# Patient Record
Sex: FEMALE | Race: WHITE | NOT HISPANIC OR LATINO
[De-identification: names, ages, dates, MRNs, and addresses within clinical notes are randomized per-mention and may not be internally consistent; named-entity substitution may affect disease eponyms.]

---

## 2017-10-24 ENCOUNTER — CHARTING TRANS (OUTPATIENT)
Dept: OTHER | Age: 37
End: 2017-10-24

## 2017-10-24 ENCOUNTER — LAB SERVICES (OUTPATIENT)
Dept: OTHER | Age: 37
End: 2017-10-24

## 2017-10-24 LAB
APPEARANCE: CLEAR
BILIRUBIN: NEGATIVE
COLOR: YELLOW
GLUCOSE U: NEGATIVE
KETONES: 40
LEUKOCYTE ESTERASE: NORMAL
NITRITE: NEGATIVE
OCCULT BLOOD: NORMAL
PH: 6.5
PROTEIN: NEGATIVE
URINE SPEC GRAVITY: 1.02
UROBILINOGEN: 0.2

## 2017-10-24 ASSESSMENT — PAIN SCALES - GENERAL: PAINLEVEL_OUTOF10: 5

## 2017-10-27 LAB — BACTERIA UR CULT: NORMAL

## 2018-11-02 VITALS
TEMPERATURE: 98 F | HEART RATE: 84 BPM | WEIGHT: 143.3 LBS | SYSTOLIC BLOOD PRESSURE: 129 MMHG | DIASTOLIC BLOOD PRESSURE: 94 MMHG | OXYGEN SATURATION: 100 % | RESPIRATION RATE: 16 BRPM

## 2023-03-22 RX ORDER — ESOMEPRAZOLE MAGNESIUM 40 MG/1
CAPSULE, DELAYED RELEASE ORAL
Qty: 1 CAPSULE | Refills: 0 | OUTPATIENT
Start: 2023-03-22 | End: 2023-03-23

## 2023-03-22 RX ORDER — MONTELUKAST SODIUM 10 MG/1
TABLET ORAL
Qty: 1 TABLET | Refills: 0 | OUTPATIENT
Start: 2023-03-22 | End: 2023-03-23

## 2023-06-19 ENCOUNTER — APPOINTMENT (OUTPATIENT)
Dept: LAB | Facility: LAB | Age: 43
End: 2023-06-19
Payer: COMMERCIAL

## 2023-06-19 LAB
ANTI-DNA (DS): <1 IU/ML
ANTI-SSA: <0.2 AI
COMPLEMENT C3 (MG/DL) IN SER/PLAS: 135 MG/DL (ref 87–200)
COMPLEMENT C4 (MG/DL) IN SER/PLAS: 41 MG/DL (ref 10–50)

## 2023-06-21 LAB
COLLECTION DURATION OF URINE: 24 HR
CREATININE (MG/24HR) IN 24 HOUR URINE: 1.37 G/24H (ref 0.67–1.59)
CREATININE (MG/DL) IN URINE: 65.3 MG/DL (ref 20–320)
PROTEIN (MG/24HR) IN 24 HOUR URINE: 105 MG/24H (ref 0–149)
PROTEIN URINE: 5 MG/DL
VOLUME OF URINE: 2101 ML

## 2023-07-19 LAB
CALCIDIOL (25 OH VITAMIN D3) (NG/ML) IN SER/PLAS: 75 NG/ML
COBALAMIN (VITAMIN B12) (PG/ML) IN SER/PLAS: 621 PG/ML (ref 211–911)

## 2023-07-20 LAB
DEHYDROEPIANDROSTERONE SULFATE (DHEA-S) (UG/DL) IN SER/: 139 UG/DL (ref 12–379)
PROGESTERONE (NG/ML) IN SER/PLAS: 0.4 NG/ML

## 2023-07-23 LAB — ESTROGEN,TOTAL: 112 PG/ML

## 2023-07-24 LAB
VITAMIN B1, WHOLE BLOOD: 170 NMOL/L (ref 70–180)
VITAMIN B6: 66.9 NMOL/L (ref 20–125)

## 2023-07-25 LAB
TESTOSTERONE FREE (CHAN): 1.1 PG/ML (ref 0.1–6.4)
TESTOSTERONE,TOTAL,LC-MS/MS: 27 NG/DL (ref 2–45)
VITAMIN B2, PLASMA: 35 NMOL/L (ref 5–50)

## 2023-09-27 PROBLEM — E06.3 HASHIMOTO'S THYROIDITIS: Status: ACTIVE | Noted: 2017-02-14

## 2023-09-27 PROBLEM — M21.271: Status: ACTIVE | Noted: 2022-12-13

## 2023-09-27 PROBLEM — Z15.09 BRCA1-ASSOCIATED PROTEIN-1 TUMOR PREDISPOSITION SYNDROME: Status: ACTIVE | Noted: 2023-09-27

## 2023-09-27 PROBLEM — G57.62 MORTON'S NEUROMA OF LEFT FOOT: Status: ACTIVE | Noted: 2023-09-27

## 2023-09-27 PROBLEM — M25.50 ARTHRALGIA: Status: ACTIVE | Noted: 2023-06-10

## 2023-09-27 PROBLEM — E34.9 HORMONAL DISORDER: Status: ACTIVE | Noted: 2023-09-27

## 2023-09-27 PROBLEM — H60.60 CHRONIC OTITIS EXTERNA: Status: ACTIVE | Noted: 2023-06-10

## 2023-09-27 PROBLEM — R01.1 CARDIAC MURMUR: Status: ACTIVE | Noted: 2018-05-11

## 2023-09-27 PROBLEM — T75.3XXA MOTION SICKNESS: Status: ACTIVE | Noted: 2018-09-21

## 2023-09-27 PROBLEM — Z15.01 BRCA1-ASSOCIATED PROTEIN-1 TUMOR PREDISPOSITION SYNDROME: Status: ACTIVE | Noted: 2023-09-27

## 2023-09-27 PROBLEM — J32.9 CHRONIC SINUSITIS: Status: ACTIVE | Noted: 2017-06-30

## 2023-09-27 PROBLEM — K22.2 ESOPHAGEAL STENOSIS: Status: ACTIVE | Noted: 2021-05-05

## 2023-09-27 PROBLEM — J30.9 ALLERGIC RHINITIS: Status: ACTIVE | Noted: 2023-06-10

## 2023-09-27 PROBLEM — M79.7 FIBROMYALGIA: Status: ACTIVE | Noted: 2023-09-27

## 2023-09-27 PROBLEM — K58.9 IRRITABLE BOWEL SYNDROME: Status: ACTIVE | Noted: 2023-09-27

## 2023-09-27 PROBLEM — K22.2 SCHATZKI'S RING: Status: ACTIVE | Noted: 2017-06-30

## 2023-09-27 PROBLEM — M79.10 MYALGIA: Status: ACTIVE | Noted: 2023-09-27

## 2023-09-27 PROBLEM — F32.A ANXIETY AND DEPRESSION: Status: ACTIVE | Noted: 2023-09-27

## 2023-09-27 PROBLEM — M54.2 CERVICALGIA: Status: ACTIVE | Noted: 2023-09-27

## 2023-09-27 PROBLEM — R76.8 POSITIVE ANA (ANTINUCLEAR ANTIBODY): Status: ACTIVE | Noted: 2023-09-27

## 2023-09-27 PROBLEM — E03.9 ACQUIRED HYPOTHYROIDISM: Status: ACTIVE | Noted: 2023-06-10

## 2023-09-27 PROBLEM — F41.9 ANXIETY AND DEPRESSION: Status: ACTIVE | Noted: 2023-09-27

## 2023-09-27 PROBLEM — M26.629 TMJ PAIN DYSFUNCTION SYNDROME: Status: ACTIVE | Noted: 2023-09-27

## 2023-09-27 PROBLEM — Z15.02 BRCA1-ASSOCIATED PROTEIN-1 TUMOR PREDISPOSITION SYNDROME: Status: ACTIVE | Noted: 2023-09-27

## 2023-09-27 PROBLEM — F51.01 PRIMARY INSOMNIA: Status: ACTIVE | Noted: 2023-06-10

## 2023-09-27 PROBLEM — E78.2 MIXED HYPERLIPIDEMIA: Status: ACTIVE | Noted: 2023-06-10

## 2023-09-27 PROBLEM — M35.7 FOOT JOINT HYPERMOBILITY: Status: ACTIVE | Noted: 2022-12-13

## 2023-09-27 PROBLEM — R01.1 SYSTOLIC MURMUR: Status: ACTIVE | Noted: 2017-06-30

## 2023-09-27 RX ORDER — FLUOCINOLONE ACETONIDE 0.1 MG/ML
SOLUTION TOPICAL
COMMUNITY
Start: 2022-04-05

## 2023-09-27 RX ORDER — SUMATRIPTAN SUCCINATE 100 MG/1
100 TABLET ORAL 2 TIMES DAILY
COMMUNITY
Start: 2022-09-21

## 2023-09-27 RX ORDER — LIOTHYRONINE SODIUM 5 UG/1
1 TABLET ORAL DAILY
COMMUNITY
Start: 2019-07-23

## 2023-09-27 RX ORDER — LORATADINE 10 MG/1
10 TABLET ORAL
COMMUNITY

## 2023-09-27 RX ORDER — VITAMIN E (DL,TOCOPHERYL ACET) 180 MG
2 CAPSULE ORAL DAILY
COMMUNITY

## 2023-09-27 RX ORDER — LEVOTHYROXINE SODIUM 112 UG/1
112 TABLET ORAL DAILY
COMMUNITY
Start: 2017-06-25

## 2023-09-27 RX ORDER — MOMETASONE FUROATE 50 UG/1
SPRAY, METERED NASAL
COMMUNITY
Start: 2013-04-28

## 2023-09-27 RX ORDER — AZELASTINE HYDROCHLORIDE 0.5 MG/ML
SOLUTION/ DROPS OPHTHALMIC
COMMUNITY
Start: 2022-12-06

## 2023-09-27 RX ORDER — ONDANSETRON 4 MG/1
4 TABLET, ORALLY DISINTEGRATING ORAL EVERY 8 HOURS PRN
COMMUNITY
Start: 2022-05-24

## 2023-09-27 RX ORDER — LOSARTAN POTASSIUM AND HYDROCHLOROTHIAZIDE 25; 100 MG/1; MG/1
1 TABLET ORAL DAILY
COMMUNITY
Start: 2019-02-27

## 2023-09-27 RX ORDER — HYDROCHLOROTHIAZIDE 25 MG/1
1 TABLET ORAL DAILY
COMMUNITY
Start: 2017-12-28

## 2023-09-27 RX ORDER — ALPRAZOLAM 0.25 MG/1
0.25 TABLET ORAL AS NEEDED
COMMUNITY
Start: 2021-01-30

## 2023-09-27 RX ORDER — FLUTICASONE PROPIONATE 50 MCG
1 SPRAY, SUSPENSION (ML) NASAL
COMMUNITY

## 2023-09-27 RX ORDER — LOSARTAN POTASSIUM AND HYDROCHLOROTHIAZIDE 12.5; 5 MG/1; MG/1
1 TABLET ORAL DAILY
COMMUNITY

## 2023-09-27 RX ORDER — LIOTHYRONINE SODIUM 10 UG/ML
10 INJECTION, SOLUTION INTRAVENOUS
COMMUNITY

## 2023-09-27 RX ORDER — MUPIROCIN 20 MG/G
OINTMENT TOPICAL
COMMUNITY
Start: 2023-04-06

## 2023-09-27 RX ORDER — DOCUSATE SODIUM 100 MG/1
1 CAPSULE, LIQUID FILLED ORAL 2 TIMES DAILY
COMMUNITY
Start: 2021-05-12

## 2023-09-27 RX ORDER — GABAPENTIN 300 MG/1
300 CAPSULE ORAL 3 TIMES DAILY
COMMUNITY
Start: 2023-06-20 | End: 2024-06-19

## 2023-09-27 RX ORDER — CONJUGATED ESTROGENS AND MEDROXYPROGESTERONE ACETATE .625; 2.5 MG/1; MG/1
1 TABLET, SUGAR COATED ORAL
COMMUNITY
Start: 2023-06-16

## 2023-09-27 RX ORDER — ACETAMINOPHEN 325 MG/1
2 TABLET ORAL EVERY 4 HOURS PRN
COMMUNITY
Start: 2021-05-12

## 2023-09-27 RX ORDER — ESOMEPRAZOLE MAGNESIUM 40 MG/1
40 CAPSULE, DELAYED RELEASE ORAL
COMMUNITY
Start: 2022-04-05

## 2023-09-27 RX ORDER — POTASSIUM CHLORIDE 750 MG/1
1 TABLET, FILM COATED, EXTENDED RELEASE ORAL DAILY
COMMUNITY
Start: 2017-06-30

## 2023-09-27 RX ORDER — VENLAFAXINE HYDROCHLORIDE 37.5 MG/1
37.5 CAPSULE, EXTENDED RELEASE ORAL DAILY
COMMUNITY
Start: 2022-04-05

## 2023-09-27 RX ORDER — BUDESONIDE 0.5 MG/2ML
INHALANT ORAL
COMMUNITY
Start: 2017-07-18

## 2023-09-27 RX ORDER — TRAZODONE HYDROCHLORIDE 50 MG/1
50 TABLET ORAL
COMMUNITY
Start: 2022-12-19

## 2023-09-27 RX ORDER — MONTELUKAST SODIUM 10 MG/1
1 TABLET ORAL DAILY
COMMUNITY
Start: 2022-04-05

## 2023-09-27 RX ORDER — KETOCONAZOLE 20 MG/G
CREAM TOPICAL
COMMUNITY
Start: 2023-04-06

## 2023-09-27 RX ORDER — AZELASTINE 1 MG/ML
2 SPRAY, METERED NASAL NIGHTLY
COMMUNITY
Start: 2017-08-15

## 2023-09-27 RX ORDER — PANTOPRAZOLE SODIUM 40 MG/1
40 TABLET, DELAYED RELEASE ORAL
COMMUNITY
Start: 2021-01-20

## 2023-09-27 RX ORDER — SUCRALFATE 1 G/1
1 TABLET ORAL EVERY 12 HOURS
COMMUNITY
Start: 2022-12-19

## 2023-09-27 RX ORDER — VENLAFAXINE HYDROCHLORIDE 150 MG/1
150 CAPSULE, EXTENDED RELEASE ORAL
COMMUNITY
Start: 2021-02-25

## 2023-09-27 RX ORDER — ESTRADIOL AND NORETHINDRONE ACETATE 1; .5 MG/1; MG/1
1 TABLET ORAL
COMMUNITY
Start: 2022-11-26

## 2023-09-27 RX ORDER — CICLOPIROX 7.7 MG/G
GEL TOPICAL 2 TIMES DAILY
COMMUNITY
Start: 2023-04-07

## 2023-09-27 RX ORDER — ACETAMINOPHEN 500 MG
50 TABLET ORAL
COMMUNITY

## 2023-09-27 RX ORDER — OXYCODONE HYDROCHLORIDE 5 MG/1
1 TABLET ORAL EVERY 8 HOURS PRN
COMMUNITY
Start: 2021-05-12

## 2023-09-27 RX ORDER — AMLODIPINE BESYLATE 5 MG/1
5 TABLET ORAL
COMMUNITY
Start: 2021-03-24

## 2023-10-02 ENCOUNTER — APPOINTMENT (OUTPATIENT)
Dept: INTEGRATIVE MEDICINE | Facility: CLINIC | Age: 43
End: 2023-10-02
Payer: COMMERCIAL

## 2023-10-09 ENCOUNTER — ALLIED HEALTH (OUTPATIENT)
Dept: INTEGRATIVE MEDICINE | Facility: CLINIC | Age: 43
End: 2023-10-09
Payer: COMMERCIAL

## 2023-10-09 DIAGNOSIS — M99.01 SOMATIC DYSFUNCTION OF CERVICAL REGION: Primary | ICD-10-CM

## 2023-10-09 DIAGNOSIS — M99.03 SOMATIC DYSFUNCTION OF LUMBAR REGION: ICD-10-CM

## 2023-10-09 DIAGNOSIS — M54.50 CHRONIC LOW BACK PAIN WITHOUT SCIATICA, UNSPECIFIED BACK PAIN LATERALITY: ICD-10-CM

## 2023-10-09 DIAGNOSIS — M99.02 SOMATIC DYSFUNCTION OF THORACIC REGION: ICD-10-CM

## 2023-10-09 DIAGNOSIS — M99.04 SACROILIAC JOINT SOMATIC DYSFUNCTION: ICD-10-CM

## 2023-10-09 DIAGNOSIS — M79.18 MYALGIA, OTHER SITE: ICD-10-CM

## 2023-10-09 DIAGNOSIS — G89.29 CHRONIC LOW BACK PAIN WITHOUT SCIATICA, UNSPECIFIED BACK PAIN LATERALITY: ICD-10-CM

## 2023-10-09 DIAGNOSIS — M26.629 TMJ PAIN DYSFUNCTION SYNDROME: ICD-10-CM

## 2023-10-09 DIAGNOSIS — M54.2 CERVICALGIA: ICD-10-CM

## 2023-10-09 PROCEDURE — 98941 CHIROPRACT MANJ 3-4 REGIONS: CPT | Performed by: CHIROPRACTOR

## 2023-10-09 PROCEDURE — 97112 NEUROMUSCULAR REEDUCATION: CPT | Performed by: CHIROPRACTOR

## 2023-10-09 ASSESSMENT — ENCOUNTER SYMPTOMS
CONSTIPATION: 0
FATIGUE: 0
CHEST TIGHTNESS: 0
ABDOMINAL PAIN: 0
FEVER: 0
DIARRHEA: 0
JOINT SWELLING: 0
CONFUSION: 0
TROUBLE SWALLOWING: 0
FREQUENCY: 0

## 2023-10-09 NOTE — PROGRESS NOTES
Subjective   Patient ID: Terra Cross is a 43 y.o. female who presents today for full spine complaints, widespread body aches, B hand and feet tingling.     10/26 [facility]    HPI - The patient reports that she had recently returned from a trip to [place]. She reports that she continues to experience left foot pain but it has been less severe lately. She notes continued jaw tightness as well as neck and upper back discomfort.  Neuropathic symptoms also remain present. (She believes her foot pain is due to over supination when she is walking. She points to the lateral, somewhat distal aspect of the foot (L>R) as the source of the pain which is very tender to palpation. )    (07/19/2023: The patient is presenting for initial evaluation and management of chronic complaints of widespread body as well as neuropathic symptoms. She also reports a history of TMJ concerns as well as a previous left ulnar release procedure at the cubital tunnel. All of which led her to receive chiropractic care in the past. She further reports that in 2020 she sustained a C. Diff. infection and she then noticed a spike in her pain syndrome as well as neuropathic symptoms. She mostly describes her spinal complaints as achy and stiff and describes her feet and hand symptoms as tingling and pulsing. This is constant. She was treated in the past for a Barlow's Neuroma on the left but that diagnosis was inconclusive. The left foot sensations and pain have been severe enough to cause falls and altered gait in the past. Provocative factors do include standing and walking. She reports that her travel and social life has also been negatively affected due to her pain syndrome.)     Review of Systems   Constitutional:  Negative for fatigue and fever.   HENT:  Negative for trouble swallowing.    Eyes:  Negative for visual disturbance.   Respiratory:  Negative for chest tightness.    Cardiovascular:  Negative for chest pain and leg swelling.    Gastrointestinal:  Negative for abdominal pain, constipation and diarrhea.   Genitourinary:  Negative for frequency.   Musculoskeletal:  Negative for joint swelling.   Neurological:  Negative for syncope.   Psychiatric/Behavioral:  Negative for confusion.    All other systems reviewed and are negative.      Objective     The patient is alert and oriented x 3. FHC.  Cranial nerves II-XII are grossly intact. No difficulty with transitional movements is observed.  Rounded shoulders.  Elevated right iliac crest.  Leg length is symmetric.  No deficits observed when analyzing gait.  No scarring is present.    TMJ deviation to left with opening and closing.     Mild to moderate hypertonicity and tenderness is present in the right, masseter, suboccipitals, cervical paraspinals, SCM, upper trapezius, levator scapulae, rhomboids, thoracic paraspinals, lumbar paraspinals, left upper gluteals.  Some improvement noted as a relates to severity.    Segmental joint dysfunction was assessed with motion palpation and is identified in the following areas:  Cervical: C6/7  Thoracic: T3/4, T4/5, T/6  Lumbopelvic: L4/5, L5/S1, Left SI    Assessment/Plan   While symptoms do persist, there is reduction in objective findings.    (07/19/2023: Initial presentation is consistent with peripheral neuropathy and potentially small fiber disease with an underlying autoimmune component. I have placed a referral for neurological consultation based on these findings. In addition, the presentation is consistent with somatic dysfunction and myofascial pain. I do not identify any contraindications to a trial o care. Treatment will include various manual therapy techniques such as ART or IASTM. She receives acupuncture at an outside clinic. Knowing that we will not perform dry needling.)       Today's treatment:  Drop-table manipulation applied to the left sacroiliac joint.   Diversified manipulation to the involved cervical, thoracic and lumbar  segments, and sacroiliac joints.    Neuromuscular re-education in the form of ART applied to B proximal  levator scapulae, B SCM. Contract relax and ART applied to TMJ.  Start time for NMR was 9:45 am and ending time was 10:00 am.     Treatment Plan:   The patient tolerated today's treatment with little or no additional discomfort and was instructed to contact the office for questions or concerns.   Return in 2-3 weeks.  Procedure for breast implant removal is 11/10/2023.    Please note: Voice to text software was used when completing this note. While the note was proofread, portions may include grammatical errors. Please contact me with any questions/concerns as it relates to these types of errors.

## 2023-10-18 ENCOUNTER — ALLIED HEALTH (OUTPATIENT)
Dept: INTEGRATIVE MEDICINE | Facility: CLINIC | Age: 43
End: 2023-10-18
Payer: COMMERCIAL

## 2023-10-18 DIAGNOSIS — G89.29 CHRONIC LOW BACK PAIN WITHOUT SCIATICA, UNSPECIFIED BACK PAIN LATERALITY: ICD-10-CM

## 2023-10-18 DIAGNOSIS — M99.03 SOMATIC DYSFUNCTION OF LUMBAR REGION: ICD-10-CM

## 2023-10-18 DIAGNOSIS — M99.01 SOMATIC DYSFUNCTION OF CERVICAL REGION: Primary | ICD-10-CM

## 2023-10-18 DIAGNOSIS — M99.04 SACROILIAC JOINT SOMATIC DYSFUNCTION: ICD-10-CM

## 2023-10-18 DIAGNOSIS — M54.50 CHRONIC LOW BACK PAIN WITHOUT SCIATICA, UNSPECIFIED BACK PAIN LATERALITY: ICD-10-CM

## 2023-10-18 DIAGNOSIS — M54.2 CERVICALGIA: ICD-10-CM

## 2023-10-18 DIAGNOSIS — M79.18 MYALGIA, OTHER SITE: ICD-10-CM

## 2023-10-18 DIAGNOSIS — M26.629 TMJ PAIN DYSFUNCTION SYNDROME: ICD-10-CM

## 2023-10-18 DIAGNOSIS — M99.02 SOMATIC DYSFUNCTION OF THORACIC REGION: ICD-10-CM

## 2023-10-18 PROCEDURE — 98941 CHIROPRACT MANJ 3-4 REGIONS: CPT | Performed by: CHIROPRACTOR

## 2023-10-18 PROCEDURE — 97112 NEUROMUSCULAR REEDUCATION: CPT | Performed by: CHIROPRACTOR

## 2023-10-18 ASSESSMENT — ENCOUNTER SYMPTOMS
CONFUSION: 0
FEVER: 0
FREQUENCY: 0
TROUBLE SWALLOWING: 0
FATIGUE: 0
DIARRHEA: 0
ABDOMINAL PAIN: 0
JOINT SWELLING: 0
CHEST TIGHTNESS: 0
CONSTIPATION: 0

## 2023-10-18 NOTE — PROGRESS NOTES
Subjective   Patient ID: Terra Cross is a 43 y.o. female who presents today for full spine complaints, widespread body aches, B hand and feet tingling.     11/26 [facility]    HPI -the patient is reporting today that she feels similar to last appointment.  She reports widespread tension throughout the spine, but does note that treatment has been helpful as relates to severity.  She is considering postponing her breast implant removal procedure simply due to all of the additional events going on such as attempting to sell her house and her upcoming move to [place].    (07/19/2023: The patient is presenting for initial evaluation and management of chronic complaints of widespread body as well as neuropathic symptoms. She also reports a history of TMJ concerns as well as a previous left ulnar release procedure at the cubital tunnel. All of which led her to receive chiropractic care in the past. She further reports that in 2020 she sustained a C. Diff. infection and she then noticed a spike in her pain syndrome as well as neuropathic symptoms. She mostly describes her spinal complaints as achy and stiff and describes her feet and hand symptoms as tingling and pulsing. This is constant. She was treated in the past for a Barlow's Neuroma on the left but that diagnosis was inconclusive. The left foot sensations and pain have been severe enough to cause falls and altered gait in the past. Provocative factors do include standing and walking. She reports that her travel and social life has also been negatively affected due to her pain syndrome.)     Review of Systems   Constitutional:  Negative for fatigue and fever.   HENT:  Negative for trouble swallowing.    Eyes:  Negative for visual disturbance.   Respiratory:  Negative for chest tightness.    Cardiovascular:  Negative for chest pain and leg swelling.   Gastrointestinal:  Negative for abdominal pain, constipation and diarrhea.   Genitourinary:  Negative for frequency.    Musculoskeletal:  Negative for joint swelling.   Neurological:  Negative for syncope.   Psychiatric/Behavioral:  Negative for confusion.    All other systems reviewed and are negative.      Objective     The patient is alert and oriented x 3. FHC.  Cranial nerves II-XII are grossly intact. No difficulty with transitional movements is observed.  Rounded shoulders.  Elevated right iliac crest.  Leg length is symmetric.  No deficits observed when analyzing gait.  No scarring is present.    TMJ deviation to right with opening and closing.     Mild to moderate hypertonicity and tenderness is present in the right, masseter, suboccipitals, cervical paraspinals, SCM, upper trapezius, levator scapulae, rhomboids, thoracic paraspinals, lumbar paraspinals, left upper gluteals.  Some improvement noted as a relates to severity.    Segmental joint dysfunction was assessed with motion palpation and is identified in the following areas:  Cervical: C6/7  Thoracic: T3/4, T4/5, T/6  Lumbopelvic: L4/5, L5/S1, Left SI    Assessment/Plan       (07/19/2023: Initial presentation is consistent with peripheral neuropathy and potentially small fiber disease with an underlying autoimmune component. I have placed a referral for neurological consultation based on these findings. In addition, the presentation is consistent with somatic dysfunction and myofascial pain. I do not identify any contraindications to a trial o care. Treatment will include various manual therapy techniques such as ART or IASTM. She receives acupuncture at an outside clinic. Knowing that we will not perform dry needling.)       Today's treatment:  Drop-table manipulation applied to the left sacroiliac joint.   Diversified manipulation to the involved cervical, thoracic and lumbar segments, and sacroiliac joints.    Neuromuscular re-education in the form of ART applied to B proximal  levator scapulae, B SCM. Contract relax and ART applied to TMJ.  Start time for NMR was  9:05 am and ending time was 9:20 am.   *Treatment applied today by Spencer Portillo, chiropractic intern, under my supervision.*    Treatment Plan:   The patient tolerated today's treatment with little or no additional discomfort and was instructed to contact the office for questions or concerns.   Return in ~3 weeks.  Procedure for breast implant removal is 11/10/2023.    Please note: Voice to text software was used when completing this note. While the note was proofread, portions may include grammatical errors. Please contact me with any questions/concerns as it relates to these types of errors.

## 2023-10-25 ENCOUNTER — APPOINTMENT (OUTPATIENT)
Dept: INTEGRATIVE MEDICINE | Facility: CLINIC | Age: 43
End: 2023-10-25
Payer: COMMERCIAL

## 2023-10-30 ENCOUNTER — APPOINTMENT (OUTPATIENT)
Dept: INTEGRATIVE MEDICINE | Facility: CLINIC | Age: 43
End: 2023-10-30
Payer: COMMERCIAL

## 2023-11-08 ENCOUNTER — APPOINTMENT (OUTPATIENT)
Dept: INTEGRATIVE MEDICINE | Facility: CLINIC | Age: 43
End: 2023-11-08
Payer: COMMERCIAL

## 2023-11-13 ENCOUNTER — APPOINTMENT (OUTPATIENT)
Dept: INTEGRATIVE MEDICINE | Facility: CLINIC | Age: 43
End: 2023-11-13
Payer: COMMERCIAL

## 2023-11-20 ENCOUNTER — ALLIED HEALTH (OUTPATIENT)
Dept: INTEGRATIVE MEDICINE | Facility: CLINIC | Age: 43
End: 2023-11-20
Payer: COMMERCIAL

## 2023-11-20 DIAGNOSIS — M79.18 MYALGIA, OTHER SITE: ICD-10-CM

## 2023-11-20 DIAGNOSIS — M54.50 CHRONIC LOW BACK PAIN WITHOUT SCIATICA, UNSPECIFIED BACK PAIN LATERALITY: ICD-10-CM

## 2023-11-20 DIAGNOSIS — M99.03 SOMATIC DYSFUNCTION OF LUMBAR REGION: ICD-10-CM

## 2023-11-20 DIAGNOSIS — G89.29 CHRONIC LOW BACK PAIN WITHOUT SCIATICA, UNSPECIFIED BACK PAIN LATERALITY: ICD-10-CM

## 2023-11-20 DIAGNOSIS — M99.02 SOMATIC DYSFUNCTION OF THORACIC REGION: ICD-10-CM

## 2023-11-20 DIAGNOSIS — M99.04 SACROILIAC JOINT SOMATIC DYSFUNCTION: ICD-10-CM

## 2023-11-20 DIAGNOSIS — M99.01 SOMATIC DYSFUNCTION OF CERVICAL REGION: Primary | ICD-10-CM

## 2023-11-20 DIAGNOSIS — M26.629 TMJ PAIN DYSFUNCTION SYNDROME: ICD-10-CM

## 2023-11-20 DIAGNOSIS — M54.2 CERVICALGIA: ICD-10-CM

## 2023-11-20 PROCEDURE — 98941 CHIROPRACT MANJ 3-4 REGIONS: CPT | Performed by: CHIROPRACTOR

## 2023-11-20 PROCEDURE — 97112 NEUROMUSCULAR REEDUCATION: CPT | Performed by: CHIROPRACTOR

## 2023-11-20 ASSESSMENT — ENCOUNTER SYMPTOMS
FATIGUE: 0
CHEST TIGHTNESS: 0
FEVER: 0
TROUBLE SWALLOWING: 0
CONFUSION: 0
DIARRHEA: 0
JOINT SWELLING: 0
ABDOMINAL PAIN: 0
CONSTIPATION: 0
FREQUENCY: 0

## 2023-11-20 NOTE — PROGRESS NOTES
Subjective   Patient ID: Terra Cross is a 43 y.o. female who presents today for full spine complaints, widespread body aches, B hand and feet tingling.     12/26 [facility]    HPI -There has been a gap in care recently due to her being out of town for work, then dealing with an illness, and then I having to cancel the last appointment due to personal concerns.  During this time, she continues to experience tension and soreness throughout the spine.  She continues to experience TMJ discomfort and continues to experience foot pain.  She further reports experiencing left elbow pain, which is constant, but worse in the morning of late.  She reports that it feels restricted, but also experiences neurogenic pain.  There is a history of ulnar release at the elbow.    (07/19/2023: The patient is presenting for initial evaluation and management of chronic complaints of widespread body as well as neuropathic symptoms. She also reports a history of TMJ concerns as well as a previous left ulnar release procedure at the cubital tunnel. All of which led her to receive chiropractic care in the past. She further reports that in 2020 she sustained a C. Diff. infection and she then noticed a spike in her pain syndrome as well as neuropathic symptoms. She mostly describes her spinal complaints as achy and stiff and describes her feet and hand symptoms as tingling and pulsing. This is constant. She was treated in the past for a Barlow's Neuroma on the left but that diagnosis was inconclusive. The left foot sensations and pain have been severe enough to cause falls and altered gait in the past. Provocative factors do include standing and walking. She reports that her travel and social life has also been negatively affected due to her pain syndrome.)     Review of Systems   Constitutional:  Negative for fatigue and fever.   HENT:  Negative for trouble swallowing.    Eyes:  Negative for visual disturbance.   Respiratory:  Negative for chest  tightness.    Cardiovascular:  Negative for chest pain and leg swelling.   Gastrointestinal:  Negative for abdominal pain, constipation and diarrhea.   Genitourinary:  Negative for frequency.   Musculoskeletal:  Negative for joint swelling.   Neurological:  Negative for syncope.   Psychiatric/Behavioral:  Negative for confusion.    All other systems reviewed and are negative.      Objective     The patient is alert and oriented x 3. FHC.  Cranial nerves II-XII are grossly intact. No difficulty with transitional movements is observed.  Rounded shoulders.  Elevated right iliac crest.  Leg length is symmetric.  No deficits observed when analyzing gait.  No scarring is present.    TMJ deviation to right with opening and closing, worse than previous visits.    Left ROM is WFL.    Moderate hypertonicity and tenderness is present in the right, masseter, suboccipitals, cervical paraspinals, SCM, upper trapezius, levator scapulae, rhomboids, thoracic paraspinals, lumbar paraspinals, left upper gluteals.  Some improvement noted as a relates to severity.    Segmental joint dysfunction was assessed with motion palpation and is identified in the following areas:  Cervical: C6/7  Thoracic: T3/4, T4/5, T/6  Lumbopelvic: L4/5, L5/S1, Left SI    Assessment/Plan       (07/19/2023: Initial presentation is consistent with peripheral neuropathy and potentially small fiber disease with an underlying autoimmune component. I have placed a referral for neurological consultation based on these findings. In addition, the presentation is consistent with somatic dysfunction and myofascial pain. I do not identify any contraindications to a trial o care. Treatment will include various manual therapy techniques such as ART or IASTM. She receives acupuncture at an outside clinic. Knowing that we will not perform dry needling.)       Today's treatment:  Drop-table manipulation applied to the left sacroiliac joint.   Diversified manipulation to the  involved cervical, thoracic and lumbar segments, and sacroiliac joints.    Neuromuscular re-education in the form of ART applied to L distal levator scapulae, L SCM. Contract relax and ART applied to TMJ.  Start time for NMR was 1:05 pm and ending time was 1:15 pm.     Treatment Plan:   The patient tolerated today's treatment with little or no additional discomfort and was instructed to contact the office for questions or concerns.   Return in ~3 weeks.      Please note: Voice to text software was used when completing this note. While the note was proofread, portions may include grammatical errors. Please contact me with any questions/concerns as it relates to these types of errors.